# Patient Record
Sex: MALE | Race: WHITE | NOT HISPANIC OR LATINO | Employment: FULL TIME | URBAN - METROPOLITAN AREA
[De-identification: names, ages, dates, MRNs, and addresses within clinical notes are randomized per-mention and may not be internally consistent; named-entity substitution may affect disease eponyms.]

---

## 2023-12-13 ENCOUNTER — APPOINTMENT (OUTPATIENT)
Dept: RADIOLOGY | Facility: IMAGING CENTER | Age: 63
End: 2023-12-13
Payer: COMMERCIAL

## 2023-12-13 ENCOUNTER — OFFICE VISIT (OUTPATIENT)
Dept: URGENT CARE | Facility: CLINIC | Age: 63
End: 2023-12-13
Payer: COMMERCIAL

## 2023-12-13 VITALS
OXYGEN SATURATION: 95 % | SYSTOLIC BLOOD PRESSURE: 118 MMHG | WEIGHT: 270 LBS | HEART RATE: 96 BPM | BODY MASS INDEX: 34.65 KG/M2 | RESPIRATION RATE: 18 BRPM | TEMPERATURE: 101.7 F | HEIGHT: 74 IN | DIASTOLIC BLOOD PRESSURE: 78 MMHG

## 2023-12-13 DIAGNOSIS — R06.09 OTHER FORM OF DYSPNEA: ICD-10-CM

## 2023-12-13 DIAGNOSIS — J11.1 INFLUENZA: ICD-10-CM

## 2023-12-13 DIAGNOSIS — R05.9 COUGH, UNSPECIFIED TYPE: ICD-10-CM

## 2023-12-13 LAB
FLUAV RNA SPEC QL NAA+PROBE: POSITIVE
FLUBV RNA SPEC QL NAA+PROBE: NEGATIVE
RSV RNA SPEC QL NAA+PROBE: NEGATIVE
SARS-COV-2 RNA RESP QL NAA+PROBE: NEGATIVE

## 2023-12-13 PROCEDURE — 3078F DIAST BP <80 MM HG: CPT

## 2023-12-13 PROCEDURE — 71046 X-RAY EXAM CHEST 2 VIEWS: CPT | Mod: TC

## 2023-12-13 PROCEDURE — 0241U POCT CEPHEID COV-2, FLU A/B, RSV - PCR: CPT

## 2023-12-13 PROCEDURE — 3074F SYST BP LT 130 MM HG: CPT

## 2023-12-13 PROCEDURE — 99213 OFFICE O/P EST LOW 20 MIN: CPT

## 2023-12-13 RX ORDER — LISINOPRIL 5 MG/1
5 TABLET ORAL DAILY
COMMUNITY

## 2023-12-13 RX ORDER — HYDROCHLOROTHIAZIDE 25 MG/1
25 TABLET ORAL DAILY
COMMUNITY

## 2023-12-13 RX ORDER — OSELTAMIVIR PHOSPHATE 75 MG/1
75 CAPSULE ORAL 2 TIMES DAILY
Qty: 10 CAPSULE | Refills: 0 | Status: SHIPPED | OUTPATIENT
Start: 2023-12-13

## 2023-12-13 ASSESSMENT — ENCOUNTER SYMPTOMS
DIARRHEA: 0
SORE THROAT: 0
SHORTNESS OF BREATH: 1
FEVER: 1
NAUSEA: 0
ABDOMINAL PAIN: 0
CHILLS: 1
COUGH: 1
VOMITING: 0
SPUTUM PRODUCTION: 0

## 2023-12-14 NOTE — PROGRESS NOTES
"  CHIEF COMPLAINT  Chief Complaint   Patient presents with    Headache     Fever, Headache, Fatigue, Cough.      Subjective:   Ed Guy Jr. is a 63 y.o. male who presents for Headache (Fever, Headache, Fatigue, Cough. )  Patient presents urgent care with complaints of headache, fever/chills, fatigue and cough x 2 days.  He reports associated symptoms of malaise.  Denies any nausea, vomiting or diarrhea.  He does report decreased appetite but is taking adequate fluids at this time.  Patient states he took Tylenol earlier today with significant improvement in headache. He does report symptoms of shortness of breath earlier today. Denies any productive sputum.     Review of Systems   Constitutional:  Positive for chills, fever and malaise/fatigue.   HENT:  Positive for congestion. Negative for sore throat.    Respiratory:  Positive for cough and shortness of breath. Negative for sputum production.    Gastrointestinal:  Negative for abdominal pain, diarrhea, nausea and vomiting.       PAST MEDICAL HISTORY  There are no problems to display for this patient.      SURGICAL HISTORY  patient denies any surgical history    ALLERGIES  No Known Allergies    CURRENT MEDICATIONS  Home Medications       Reviewed by Ed Aggarwal Ass't (Medical Assistant) on 12/13/23 at 1614  Med List Status: <None>     Medication Last Dose Status        Patient Yohannes Taking any Medications                           SOCIAL HISTORY  Social History     Tobacco Use    Smoking status: Not on file    Smokeless tobacco: Not on file   Substance and Sexual Activity    Alcohol use: Not on file    Drug use: Not on file    Sexual activity: Not on file       FAMILY HISTORY  No family history on file.      Medications, Allergies, and current problem list reviewed today in Epic.     Objective:     /78   Pulse 96   Temp (!) 38.7 °C (101.7 °F) (Temporal)   Resp 18   Ht 1.88 m (6' 2\")   Wt 122 kg (270 lb)   SpO2 95%     Physical " Exam  Vitals reviewed.   Constitutional:       General: He is not in acute distress.     Appearance: He is not toxic-appearing.   HENT:      Head: Normocephalic.      Right Ear: Tympanic membrane normal.      Left Ear: Tympanic membrane normal.      Nose: Nose normal.      Mouth/Throat:      Mouth: Mucous membranes are moist.      Pharynx: Oropharynx is clear. No oropharyngeal exudate or posterior oropharyngeal erythema.   Cardiovascular:      Rate and Rhythm: Normal rate and regular rhythm.      Pulses: Normal pulses.   Pulmonary:      Effort: Pulmonary effort is normal. No respiratory distress.      Breath sounds: Normal breath sounds. No stridor. No wheezing, rhonchi or rales.   Musculoskeletal:         General: Normal range of motion.      Cervical back: Normal range of motion and neck supple.   Lymphadenopathy:      Cervical: No cervical adenopathy.   Skin:     General: Skin is warm.      Capillary Refill: Capillary refill takes less than 2 seconds.   Neurological:      General: No focal deficit present.      Mental Status: He is alert.   Psychiatric:         Mood and Affect: Mood normal.         Assessment/Plan:     Diagnosis and associated orders:     1. Cough, unspecified type  POCT CEPHEID COV-2, FLU A/B, RSV - PCR      2. Other form of dyspnea  DX-CHEST-2 VIEWS    CANCELED: DX-CHEST-2 VIEWS      3. Influenza  oseltamivir (TAMIFLU) 75 MG Cap         Comments/MDM:     Patient presents urgent care with complaints of headache, fever, cough, congestion and fatigue x 2 days.  He reports associated symptoms of shortness of breath earlier this morning which prompted him to follow-up with urgent care.  Upon discal exam patient is alert and in no signs of acute distress.  He is clear to auscultation bilaterally.  No crackles, rhonchi or wheezes appreciated.  Good air movement throughout.  Bilateral TMs normal.  Mucous membranes are moist.  No erythema or exudate.  Uvula is midline.  No lymphadenopathy.  Vital signs  are stable, he is febrile in clinic.  Point of care viral panel done in clinic. Notified of Positive Flu A.  Counseled patient on viral etiology of symptoms.  Instructed on the use of OTC medications for the alleviation of discomfort.  Counseled on increased fluid intake and rest.  Chest x-ray completed. No signs of acute cardiopulmonary disease.   Instructed to return to ER or urgent care if symptoms worsen or fail to improve.         Differential diagnosis, natural history, supportive care, and indications for immediate follow-up discussed.    Advised the patient to follow-up with the primary care physician for recheck, reevaluation, and consideration of further management.    Please note that this dictation was created using voice recognition software. I have made a reasonable attempt to correct obvious errors, but I expect that there are errors of grammar and possibly content that I did not discover before finalizing the note.    This note was electronically signed by DORIE Galvez

## 2024-10-23 ENCOUNTER — OFFICE (OUTPATIENT)
Dept: URBAN - METROPOLITAN AREA CLINIC 13 | Facility: CLINIC | Age: 64
End: 2024-10-23
Payer: COMMERCIAL

## 2024-10-23 VITALS
WEIGHT: 264 LBS | HEART RATE: 102 BPM | SYSTOLIC BLOOD PRESSURE: 120 MMHG | DIASTOLIC BLOOD PRESSURE: 84 MMHG | HEIGHT: 75 IN | OXYGEN SATURATION: 98 %

## 2024-10-23 DIAGNOSIS — E66.9 OBESITY, UNSPECIFIED: ICD-10-CM

## 2024-10-23 DIAGNOSIS — K59.00 CONSTIPATION, UNSPECIFIED: ICD-10-CM

## 2024-10-23 DIAGNOSIS — R14.0 ABDOMINAL DISTENSION (GASEOUS): ICD-10-CM

## 2024-10-23 PROCEDURE — 99203 OFFICE O/P NEW LOW 30 MIN: CPT | Performed by: INTERNAL MEDICINE
